# Patient Record
Sex: MALE | Race: WHITE
[De-identification: names, ages, dates, MRNs, and addresses within clinical notes are randomized per-mention and may not be internally consistent; named-entity substitution may affect disease eponyms.]

---

## 2017-06-28 ENCOUNTER — APPOINTMENT (OUTPATIENT)
Dept: CARDIOLOGY | Facility: CLINIC | Age: 77
End: 2017-06-28

## 2017-11-09 ENCOUNTER — RECORD ABSTRACTING (OUTPATIENT)
Age: 77
End: 2017-11-09

## 2017-11-09 DIAGNOSIS — Z82.49 FAMILY HISTORY OF ISCHEMIC HEART DISEASE AND OTHER DISEASES OF THE CIRCULATORY SYSTEM: ICD-10-CM

## 2017-11-09 DIAGNOSIS — Z98.890 OTHER SPECIFIED POSTPROCEDURAL STATES: ICD-10-CM

## 2017-11-09 DIAGNOSIS — I72.8 ANEURYSM OF OTHER SPECIFIED ARTERIES: ICD-10-CM

## 2017-11-09 DIAGNOSIS — R73.03 PREDIABETES.: ICD-10-CM

## 2017-11-09 DIAGNOSIS — G47.30 SLEEP APNEA, UNSPECIFIED: ICD-10-CM

## 2017-11-09 DIAGNOSIS — M35.3 POLYMYALGIA RHEUMATICA: ICD-10-CM

## 2017-11-09 DIAGNOSIS — Z78.9 OTHER SPECIFIED HEALTH STATUS: ICD-10-CM

## 2017-11-09 DIAGNOSIS — Z85.46 PERSONAL HISTORY OF MALIGNANT NEOPLASM OF PROSTATE: ICD-10-CM

## 2017-12-19 ENCOUNTER — APPOINTMENT (OUTPATIENT)
Dept: CARDIOLOGY | Facility: CLINIC | Age: 77
End: 2017-12-19
Payer: MEDICARE

## 2017-12-19 ENCOUNTER — NON-APPOINTMENT (OUTPATIENT)
Age: 77
End: 2017-12-19

## 2017-12-19 VITALS
BODY MASS INDEX: 33.34 KG/M2 | OXYGEN SATURATION: 99 % | HEIGHT: 68 IN | DIASTOLIC BLOOD PRESSURE: 72 MMHG | SYSTOLIC BLOOD PRESSURE: 128 MMHG | HEART RATE: 72 BPM | WEIGHT: 220 LBS

## 2017-12-19 DIAGNOSIS — I44.0 ATRIOVENTRICULAR BLOCK, FIRST DEGREE: ICD-10-CM

## 2017-12-19 DIAGNOSIS — I45.10 UNSPECIFIED RIGHT BUNDLE-BRANCH BLOCK: ICD-10-CM

## 2017-12-19 DIAGNOSIS — I49.3 VENTRICULAR PREMATURE DEPOLARIZATION: ICD-10-CM

## 2017-12-19 PROCEDURE — 99214 OFFICE O/P EST MOD 30 MIN: CPT

## 2017-12-19 PROCEDURE — 93000 ELECTROCARDIOGRAM COMPLETE: CPT

## 2017-12-19 RX ORDER — PREDNISONE 5 MG/1
5 TABLET ORAL DAILY
Refills: 0 | Status: DISCONTINUED | COMMUNITY
End: 2017-12-19

## 2017-12-19 RX ORDER — ALPRAZOLAM 0.25 MG/1
0.25 TABLET ORAL
Refills: 0 | Status: ACTIVE | COMMUNITY

## 2017-12-21 ENCOUNTER — APPOINTMENT (OUTPATIENT)
Dept: CARDIOLOGY | Facility: CLINIC | Age: 77
End: 2017-12-21
Payer: MEDICARE

## 2017-12-21 PROCEDURE — 93306 TTE W/DOPPLER COMPLETE: CPT

## 2018-01-04 ENCOUNTER — APPOINTMENT (OUTPATIENT)
Dept: CARDIOLOGY | Facility: CLINIC | Age: 78
End: 2018-01-04

## 2018-01-09 ENCOUNTER — RX RENEWAL (OUTPATIENT)
Age: 78
End: 2018-01-09

## 2018-01-16 ENCOUNTER — APPOINTMENT (OUTPATIENT)
Dept: CARDIOLOGY | Facility: CLINIC | Age: 78
End: 2018-01-16

## 2018-05-07 ENCOUNTER — APPOINTMENT (OUTPATIENT)
Dept: CARDIOLOGY | Facility: CLINIC | Age: 78
End: 2018-05-07
Payer: MEDICARE

## 2018-05-07 VITALS
DIASTOLIC BLOOD PRESSURE: 70 MMHG | HEART RATE: 64 BPM | HEIGHT: 68 IN | BODY MASS INDEX: 33.65 KG/M2 | WEIGHT: 222 LBS | SYSTOLIC BLOOD PRESSURE: 138 MMHG

## 2018-05-07 PROCEDURE — 99214 OFFICE O/P EST MOD 30 MIN: CPT

## 2018-05-07 RX ORDER — METHYLPREDNISOLONE 4 MG/1
4 TABLET ORAL
Refills: 0 | Status: DISCONTINUED | COMMUNITY
End: 2018-05-07

## 2018-05-07 RX ORDER — LISINOPRIL AND HYDROCHLOROTHIAZIDE TABLETS 20; 12.5 MG/1; MG/1
20-12.5 TABLET ORAL DAILY
Refills: 0 | Status: DISCONTINUED | COMMUNITY
End: 2018-05-07

## 2018-11-12 ENCOUNTER — APPOINTMENT (OUTPATIENT)
Dept: CARDIOLOGY | Facility: CLINIC | Age: 78
End: 2018-11-12
Payer: MEDICARE

## 2018-11-12 VITALS
HEART RATE: 64 BPM | BODY MASS INDEX: 33.34 KG/M2 | SYSTOLIC BLOOD PRESSURE: 130 MMHG | HEIGHT: 68 IN | DIASTOLIC BLOOD PRESSURE: 80 MMHG | WEIGHT: 220 LBS

## 2018-11-12 PROCEDURE — 99214 OFFICE O/P EST MOD 30 MIN: CPT

## 2018-11-12 NOTE — CARDIOLOGY SUMMARY
[No Ischemia] : no Ischemia [Fixed Defect] : fixed defect [___] : [unfilled] [LVEF ___%] : LVEF [unfilled]% [Moderate] : moderate pulmonary hypertension [Enlarged] : enlarged LA size [Mild] : mild mitral regurgitation

## 2018-11-12 NOTE — REASON FOR VISIT
[Follow-Up - Clinic] : a clinic follow-up of [Coronary Artery Disease] : coronary artery disease [Hyperlipidemia] : hyperlipidemia [Hypertension] : hypertension [Medication Management] : Medication management

## 2018-11-12 NOTE — ASSESSMENT
[FreeTextEntry1] : Hypertension and hypertensive heart disease by echocardiography. \par There is left ventricular hypertrophy, dilatation of chambers and thoracic aorta.\par Branch vessel CAD. Reviewed coronary angiography report.\par \par Follow up echocardiogram to monitor aortic dilatation. \par Blood work has been requested.\par Blood pressure is well controlled on today's examination.\par Now off HCTZ. \par Continue ASA and statin rx\par Emphasized lifestyle measures, weight loss, low sodium diet, regular aerobic activity\par \par Note bad experience at [facility]. Requests future procedures elsewhere. \par \par

## 2018-11-27 ENCOUNTER — APPOINTMENT (OUTPATIENT)
Dept: CARDIOLOGY | Facility: CLINIC | Age: 78
End: 2018-11-27

## 2018-11-28 ENCOUNTER — APPOINTMENT (OUTPATIENT)
Dept: CARDIOLOGY | Facility: CLINIC | Age: 78
End: 2018-11-28
Payer: MEDICARE

## 2018-11-28 PROCEDURE — 93306 TTE W/DOPPLER COMPLETE: CPT

## 2018-12-04 ENCOUNTER — APPOINTMENT (OUTPATIENT)
Dept: CARDIOLOGY | Facility: CLINIC | Age: 78
End: 2018-12-04
Payer: MEDICARE

## 2018-12-04 VITALS
BODY MASS INDEX: 33.34 KG/M2 | OXYGEN SATURATION: 98 % | WEIGHT: 220 LBS | HEIGHT: 68 IN | DIASTOLIC BLOOD PRESSURE: 64 MMHG | SYSTOLIC BLOOD PRESSURE: 118 MMHG | HEART RATE: 60 BPM

## 2018-12-04 DIAGNOSIS — I71.9 AORTIC ANEURYSM OF UNSPECIFIED SITE, W/OUT RUPTURE: ICD-10-CM

## 2018-12-04 PROCEDURE — 99214 OFFICE O/P EST MOD 30 MIN: CPT

## 2018-12-04 RX ORDER — OXYBUTYNIN CHLORIDE 5 MG/1
5 TABLET ORAL
Refills: 0 | Status: DISCONTINUED | COMMUNITY
End: 2018-12-04

## 2018-12-04 RX ORDER — IBANDRONATE SODIUM 150 MG/1
150 TABLET, FILM COATED ORAL
Refills: 0 | Status: DISCONTINUED | COMMUNITY
End: 2018-12-04

## 2018-12-04 RX ORDER — RIVASTIGMINE TARTRATE 1.5 MG/1
1.5 CAPSULE ORAL
Refills: 0 | Status: DISCONTINUED | COMMUNITY
End: 2018-12-04

## 2018-12-04 NOTE — ASSESSMENT
[FreeTextEntry1] : KATARINA RAMIREZ  is a 78 year M  who presents today Dec 04, 2018 in clinical follow-up with the above history and the following active issues. \par \par Hypertension and hypertensive heart disease by echocardiography. \par There is left ventricular hypertrophy, dilatation of chambers and thoracic aorta.\par Branch vessel CAD. \par \par Aortic dilatation by echocardiogram.  \par Recommend CTA of chest with contrast for further evaluation of aortic dimensions. \par Blood pressure is well controlled on today's examination.\par Now off HCTZ. \par Continue ASA and statin rx\par Emphasized lifestyle measures, weight loss, low sodium diet, regular aerobic activity\par \par Note bad experience at [facility]. Requests future procedures elsewhere. \par \par Red flag symptoms which would warrant sooner emergent evaluation reviewed with the patient. \par Questions and concerns were addressed and answered. \par \par CT scan will be reviewed over the phone. \par \par Clinical follow-up in our office in 6 months unless symptoms warrant sooner emergent evaluation.\par \par Sincerely,\par \par Marce Newton PA-C\par Patients history, testing and plan reviewed with supervising MD: Dr. Dc Sharma

## 2018-12-04 NOTE — ADDENDUM
[FreeTextEntry1] : Please note the patient was seen and examined with DEBORAH Newton.\madelyn I was physically present during the service of the patient and personally examined the patient. \madelyn I was directly involved in the management plan and recommendations of the care provided to the patient. \madelyn I personally reviewed the history and physical examination as documented by the PA above.\par 12/04/2018\par

## 2018-12-04 NOTE — HISTORY OF PRESENT ILLNESS
[FreeTextEntry1] : KATARINA ARMIREZ  is a 78 year M  who presents today Dec 04, 2018 in clinical follow up. Last seen in our office on November 12, 2018 with Dr. Sharma. Follow-up echo recommended. Testing has been performed and he presents today to review the results. \par Today offers no complaints. Looking forward to going to [place] in mid January. \par \par As you are aware he is a 78 year old male with a history of obesity, obstructive sleep apnea on CPAP, alcohol abuse, hypertension with hypertensive heart disease, thoracic aortic aneurysm, hepatic artery aneurysm followed by [facility], dyslipidemia, PVCs, past heavy tobacco use, subclinical atherosclerosis, PMR and prostate cancer. \par \par Previously cardiac catheterization was recommended. Sx of CRAMER and abnl NST. Seen by Dr. Weller. Branch vessel disease, PCI deferred. Intolerant of nitrates due to headache. Maintained on beta blocker and calcium channel blocker. Blood pressure is within normal limits. Back on statin rx. Reported electrolyte abnl on antihypertensive therapy. \par \par Recently diagnosed with early Alzheimer's. He follows with Dr. Cho.\par He reports he is having more trouble with his memory. \par \par There is no prior history of myocardial infarction or coronary revascularization. \par There is no history of symptomatic congestive heart failure or rheumatic heart disease. \par  \par There is no exertional chest pain, pressure or discomfort. \par There is no significant dyspnea on exertion or orthopnea. \par There are no symptomatic palpitations, lightheadedness, dizziness or syncope.\par \par Echocardiogram November 28, 2018 ejection fraction 60%, mild mitral regurgitation, aortic root 4.8 cm, ascending aorta 4.2 cm\par \par Labs November 21, 2018 Sodium 137, potassium 4.3, creatinine 0.79, BUN 21, HDL 67, LDL 78, total cholesterol 162, \par \par Nuclear stress test. December 2016. Vasodilator study. Normal LV size. Fixed defects. Likely attenuation. No ischemia. \par \par Resting echocardiogram. Ejection fraction 50-55%. Aortic dilatation, 4.5 cm, no aortic regurgitation. Moderate pulmonary hypertension, mild mitral regurgitation. \par \par Cardiac catheterization report from January 2015 reviewed. Initial cardiac catheterization unsuccessful with radial approach. Ejection fraction 55%. Dital left circumflex with 40% stenosis. Acute marginal with 90% stenosis. \par

## 2019-05-28 ENCOUNTER — NON-APPOINTMENT (OUTPATIENT)
Age: 79
End: 2019-05-28

## 2019-05-28 ENCOUNTER — APPOINTMENT (OUTPATIENT)
Dept: CARDIOLOGY | Facility: CLINIC | Age: 79
End: 2019-05-28
Payer: MEDICARE

## 2019-05-28 VITALS
DIASTOLIC BLOOD PRESSURE: 78 MMHG | HEART RATE: 53 BPM | SYSTOLIC BLOOD PRESSURE: 140 MMHG | HEIGHT: 68 IN | WEIGHT: 215 LBS | BODY MASS INDEX: 32.58 KG/M2

## 2019-05-28 DIAGNOSIS — R94.31 ABNORMAL ELECTROCARDIOGRAM [ECG] [EKG]: ICD-10-CM

## 2019-05-28 PROCEDURE — 93000 ELECTROCARDIOGRAM COMPLETE: CPT

## 2019-05-28 PROCEDURE — 99214 OFFICE O/P EST MOD 30 MIN: CPT

## 2019-05-28 RX ORDER — OXYBUTYNIN CHLORIDE 10 MG/1
10 TABLET, EXTENDED RELEASE ORAL DAILY
Refills: 0 | Status: ACTIVE | COMMUNITY

## 2019-05-28 RX ORDER — RIVASTIGMINE TARTRATE 6 MG/1
6 CAPSULE ORAL TWICE DAILY
Refills: 0 | Status: ACTIVE | COMMUNITY

## 2019-05-28 NOTE — REVIEW OF SYSTEMS
[Memory Lapses Or Loss] : memory lapses or loss [Negative] : Heme/Lymph [Lower Ext Edema] : lower extremity edema [see HPI] : see HPI

## 2019-05-28 NOTE — CARDIOLOGY SUMMARY
[No Ischemia] : no Ischemia [Fixed Defect] : fixed defect [___] : [unfilled] [LVEF ___%] : LVEF [unfilled]% [Moderate] : moderate pulmonary hypertension [Mild] : mild mitral regurgitation [Enlarged] : enlarged LA size

## 2019-05-28 NOTE — PHYSICAL EXAM
[General Appearance - Well Developed] : well developed [Normal Appearance] : normal appearance [Well Groomed] : well groomed [General Appearance - Well Nourished] : well nourished [No Deformities] : no deformities [General Appearance - In No Acute Distress] : no acute distress [Respiration, Rhythm And Depth] : normal respiratory rhythm and effort [Exaggerated Use Of Accessory Muscles For Inspiration] : no accessory muscle use [Auscultation Breath Sounds / Voice Sounds] : lungs were clear to auscultation bilaterally [Heart Rate And Rhythm] : heart rate and rhythm were normal [Murmurs] : no murmurs present [Heart Sounds] : normal S1 and S2 [Abdomen Tenderness] : non-tender [Abdomen Soft] : soft [Abnormal Walk] : normal gait [Abdomen Mass (___ Cm)] : no abdominal mass palpated [Cyanosis, Localized] : no localized cyanosis [Gait - Sufficient For Exercise Testing] : the gait was sufficient for exercise testing [Nail Clubbing] : no clubbing of the fingernails [Skin Color & Pigmentation] : normal skin color and pigmentation [Petechial Hemorrhages (___cm)] : no petechial hemorrhages [No Venous Stasis] : no venous stasis [] : no rash [Skin Lesions] : no skin lesions [No Skin Ulcers] : no skin ulcer [No Xanthoma] : no  xanthoma was observed [Oriented To Time, Place, And Person] : oriented to person, place, and time [Affect] : the affect was normal [Mood] : the mood was normal [No Anxiety] : not feeling anxious [FreeTextEntry1] : tr edema

## 2019-05-28 NOTE — HISTORY OF PRESENT ILLNESS
[FreeTextEntry1] : KATARINA RAMIREZ  is a 78 year old  M\par \par with a history of obesity, obstructive sleep apnea on CPAP, alcohol misuse, hypertension with hypertensive heart disease, thoracic aortic aneurysm, hepatic artery aneurysm followed by [facility], dyslipidemia, PVCs, past heavy tobacco use, subclinical atherosclerosis, PMR and prostate cancer. \par \par Previously cardiac catheterization was recommended. Sx of CRAMER and abnl NST. Seen by Dr. Weller. Branch vessel disease, PCI deferred. Intolerant of nitrates due to headache. Maintained on beta blocker and calcium channel blocker. Blood pressure is within normal limits. Back on statin rx. Reported electrolyte abnl on antihypertensive therapy. \par \par There is no prior history of myocardial infarction or coronary revascularization. \par There is no history of symptomatic congestive heart failure or rheumatic heart disease. \par \par Recently diagnosed with early Alzheimer's. He follows with Dr. Cho.\par He reports he intermittently has trouble with his memory. \par \par There is no exertional chest pain, pressure or discomfort. \par There is no significant dyspnea on exertion or orthopnea. \par There are no symptomatic palpitations, lightheadedness, dizziness or syncope.\par There is mild edema related to amlodipine.\par \par EKG demonstrates sinus bradycardia with first degree AV block, left axis deviation, incomplete right bundle branch block, poor R-wave progression\par \par November 2018. Hemoglobin 13.3, potassium 4.3, creatinine 0.8, LDL 78. \par Blood work, May 2019, hemoglobin 12.7, potassium 4.5, creatinine 0.8, total cholesterol 146, LDL 64, TSH 1.8.\par \par Last echocardiogram November 2018, ejection fraction 60% mild left ventricular hypertrophy. Aortic root 4.8. Ascending aorta 4.2. No aortic regurgitation \par Followup CT reports measurement of 4.2 cm there is cardiomegaly with coronary artery calcifications\par \par Nuclear stress test. December 2016. Vasodilator study. Normal LV size. Fixed defects. Likely attenuation. No ischemia. \par \par Cardiac catheterization report from January 2015 reviewed. Initial cardiac catheterization unsuccessful with radial approach. Ejection fraction 55%. Dital left circumflex with 40% stenosis. Acute marginal with 90% stenosis. \par

## 2019-05-28 NOTE — ASSESSMENT
[FreeTextEntry1] : Hypertension and hypertensive heart disease by echocardiography. \par There is left ventricular hypertrophy, dilatation of chambers and thoracic aorta.\par Branch vessel CAD. Reviewed coronary angiography report. No angina\par Minor edema\par \par Monitor aortic dilatation and LVEF. \par \par Continue ASA and statin rx\par Blood pressure is well controlled \par Continue combination therapy. Low-sodium diet. Keep legs elevated. \par Lisinopril refilled. \par \par Emphasized lifestyle measures, weight loss, low sodium diet, regular aerobic activity\par \par Note bad experience at [facility]. Requests future procedures elsewhere.

## 2019-05-28 NOTE — REASON FOR VISIT
[Hyperlipidemia] : hyperlipidemia [Follow-Up - Clinic] : a clinic follow-up of [Coronary Artery Disease] : coronary artery disease [Hypertension] : hypertension [Medication Management] : Medication management

## 2019-11-07 ENCOUNTER — RECORD ABSTRACTING (OUTPATIENT)
Age: 79
End: 2019-11-07

## 2019-11-12 ENCOUNTER — APPOINTMENT (OUTPATIENT)
Dept: CARDIOLOGY | Facility: CLINIC | Age: 79
End: 2019-11-12
Payer: MEDICARE

## 2019-11-12 VITALS
OXYGEN SATURATION: 98 % | BODY MASS INDEX: 31.22 KG/M2 | SYSTOLIC BLOOD PRESSURE: 122 MMHG | DIASTOLIC BLOOD PRESSURE: 68 MMHG | WEIGHT: 206 LBS | HEIGHT: 68 IN | HEART RATE: 59 BPM

## 2019-11-12 DIAGNOSIS — R60.0 LOCALIZED EDEMA: ICD-10-CM

## 2019-11-12 PROCEDURE — 99214 OFFICE O/P EST MOD 30 MIN: CPT

## 2019-11-12 RX ORDER — ACETAMINOPHEN 650 MG/1
650 TABLET, FILM COATED, EXTENDED RELEASE ORAL 4 TIMES DAILY
Refills: 0 | Status: ACTIVE | COMMUNITY

## 2019-11-12 RX ORDER — CITICOLINE SODIUM 500 MG
500 TABLET ORAL AS DIRECTED
Refills: 0 | Status: ACTIVE | COMMUNITY

## 2019-11-12 NOTE — PHYSICAL EXAM
[General Appearance - Well Developed] : well developed [Normal Appearance] : normal appearance [General Appearance - Well Nourished] : well nourished [Well Groomed] : well groomed [No Deformities] : no deformities [General Appearance - In No Acute Distress] : no acute distress [Respiration, Rhythm And Depth] : normal respiratory rhythm and effort [Exaggerated Use Of Accessory Muscles For Inspiration] : no accessory muscle use [Auscultation Breath Sounds / Voice Sounds] : lungs were clear to auscultation bilaterally [Heart Rate And Rhythm] : heart rate and rhythm were normal [Heart Sounds] : normal S1 and S2 [Murmurs] : no murmurs present [Abdomen Soft] : soft [Abdomen Tenderness] : non-tender [Abdomen Mass (___ Cm)] : no abdominal mass palpated [Abnormal Walk] : normal gait [Gait - Sufficient For Exercise Testing] : the gait was sufficient for exercise testing [Nail Clubbing] : no clubbing of the fingernails [Cyanosis, Localized] : no localized cyanosis [Petechial Hemorrhages (___cm)] : no petechial hemorrhages [Skin Color & Pigmentation] : normal skin color and pigmentation [] : no rash [No Venous Stasis] : no venous stasis [Skin Lesions] : no skin lesions [No Skin Ulcers] : no skin ulcer [Oriented To Time, Place, And Person] : oriented to person, place, and time [Affect] : the affect was normal [No Xanthoma] : no  xanthoma was observed [No Anxiety] : not feeling anxious [Mood] : the mood was normal [FreeTextEntry1] : tr edema

## 2019-11-12 NOTE — HISTORY OF PRESENT ILLNESS
[FreeTextEntry1] : KATARINA RAMIREZ  is a 79 year old  M\par \par with a history of obesity, obstructive sleep apnea on CPAP, alcohol misuse, hypertension with hypertensive heart disease, thoracic aortic aneurysm, hepatic artery aneurysm followed by [facility], dyslipidemia, PVCs, past heavy tobacco use, subclinical atherosclerosis, PMR and prostate cancer. \par \par Previously cardiac catheterization was recommended. Sx of CRAMER and abnl NST. Seen by Dr. Weller. Branch vessel disease, PCI deferred. Intolerant of nitrates due to headache. Maintained on beta blocker and calcium channel blocker. Blood pressure is within normal limits. Back on statin rx. Reported electrolyte abnl on antihypertensive therapy. \par \par There is no prior history of myocardial infarction or coronary revascularization. \par There is no history of symptomatic congestive heart failure or rheumatic heart disease. \par \par Diagnosed with early Alzheimer's. He follows with Dr. Cho.\par He reports he intermittently has trouble with his memory. \par \par Since last seen, no illness or hospital stay. 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. \par There is no exertional chest pain, pressure or discomfort. \par There is no significant dyspnea on exertion or orthopnea. \par There are no symptomatic palpitations, lightheadedness, dizziness or syncope.\par There is mild edema related to amlodipine.\par \par EKG demonstrates sinus bradycardia with first degree AV block, left axis deviation, incomplete right bundle branch block, poor R-wave progression\par \par Blood work, May 2019, hemoglobin 12.7, potassium 4.5, creatinine 0.8, total cholesterol 146, LDL 64, TSH 1.8.\par \par Last echocardiogram November 2018, ejection fraction 60% mild left ventricular hypertrophy. Aortic root 4.8. Ascending aorta 4.2. No aortic regurgitation \par --- CT 12/2018 reports measurement of 4.2 cm at the aortic root, there is cardiomegaly with coronary artery calcifications\par \par Nuclear stress test. December 2016. Vasodilator study. Normal LV size. Fixed defects. Likely attenuation. No ischemia. \par \par Cardiac catheterization report from January 2015 reviewed. Initial cardiac catheterization unsuccessful with radial approach. Ejection fraction 55%. Dital left circumflex with 40% stenosis. Acute marginal with 90% stenosis. \par

## 2019-11-12 NOTE — ASSESSMENT
[FreeTextEntry1] : \par Hypertension and hypertensive heart disease by echocardiography. \par There is left ventricular hypertrophy, dilatation of chambers and thoracic aorta.\par Branch vessel CAD. Reviewed coronary angiography report. \par He remains with good baseline functional status, no angina. \par Monitor LV function by echocardiogram. \par \par CT reports largest aorta diameter of 4.2cm. BP is well controlled today. \par Surveillance monitoring recommended. \par Assess abdominal aorta by ultrasound. Recheck thoracic dimensions by echo. \par \par Continue ASA and statin rx\par Blood pressure is well controlled on ACE-I and norvasc. \par Continue combination therapy. Low-sodium diet. Keep legs elevated. \par \par Emphasized lifestyle measures, weight loss, low sodium diet, regular aerobic activity\par \par Note bad experience at [facility]. Requests future procedures elsewhere. \par \par Will call to address above results. Otherwise, routine F/U in 6 months.\par Any questions and concerns were addressed and resolved. \par \par Sincerely,\par \par CHENTE Rojo\par Patients history, testing, and plan reviewed with supervising MD: Dr. Dc Sharma

## 2019-11-12 NOTE — ADDENDUM
[FreeTextEntry1] : Please note the patient was seen and examined with NP oCrrina Lagunas\madelyn I was physically present during the service of the patient and personally examined the patient. \madelyn I was directly involved in the management plan and recommendations of the care provided to the patient. \madelyn I personally reviewed the history and physical examination as documented by the NP above.\par 11/12/2019\par

## 2019-11-12 NOTE — REASON FOR VISIT
[Follow-Up - Clinic] : a clinic follow-up of [Coronary Artery Disease] : coronary artery disease [Abnormal ECG] : an abnormal ECG [Hypertension] : hypertension [Hyperlipidemia] : hyperlipidemia [Medication Management] : Medication management [Peripheral Vascular Disease] : peripheral vascular disease

## 2019-11-20 ENCOUNTER — APPOINTMENT (OUTPATIENT)
Dept: CARDIOLOGY | Facility: CLINIC | Age: 79
End: 2019-11-20
Payer: MEDICARE

## 2019-11-20 PROCEDURE — 93306 TTE W/DOPPLER COMPLETE: CPT

## 2019-11-20 PROCEDURE — 93880 EXTRACRANIAL BILAT STUDY: CPT

## 2019-11-20 PROCEDURE — 93979 VASCULAR STUDY: CPT

## 2019-11-26 ENCOUNTER — APPOINTMENT (OUTPATIENT)
Dept: CARDIOLOGY | Facility: CLINIC | Age: 79
End: 2019-11-26

## 2020-08-25 ENCOUNTER — NON-APPOINTMENT (OUTPATIENT)
Age: 80
End: 2020-08-25

## 2020-08-25 ENCOUNTER — APPOINTMENT (OUTPATIENT)
Dept: CARDIOLOGY | Facility: CLINIC | Age: 80
End: 2020-08-25
Payer: MEDICARE

## 2020-08-25 VITALS
WEIGHT: 204 LBS | BODY MASS INDEX: 30.92 KG/M2 | TEMPERATURE: 96.9 F | DIASTOLIC BLOOD PRESSURE: 74 MMHG | HEIGHT: 68 IN | SYSTOLIC BLOOD PRESSURE: 142 MMHG | HEART RATE: 70 BPM | OXYGEN SATURATION: 97 %

## 2020-08-25 DIAGNOSIS — I10 ESSENTIAL (PRIMARY) HYPERTENSION: ICD-10-CM

## 2020-08-25 DIAGNOSIS — E78.2 MIXED HYPERLIPIDEMIA: ICD-10-CM

## 2020-08-25 DIAGNOSIS — I25.10 ATHEROSCLEROTIC HEART DISEASE OF NATIVE CORONARY ARTERY W/OUT ANGINA PECTORIS: ICD-10-CM

## 2020-08-25 DIAGNOSIS — I70.90 UNSPECIFIED ATHEROSCLEROSIS: ICD-10-CM

## 2020-08-25 DIAGNOSIS — E66.9 OBESITY, UNSPECIFIED: ICD-10-CM

## 2020-08-25 PROCEDURE — 99214 OFFICE O/P EST MOD 30 MIN: CPT

## 2020-08-25 PROCEDURE — 93000 ELECTROCARDIOGRAM COMPLETE: CPT

## 2020-08-25 NOTE — ADDENDUM
[FreeTextEntry1] : Please note the patient was seen and examined with DEBORAH Newton.\par I was physically present during the service of the patient and personally examined the patient. \par I was directly involved in the management plan and recommendations of the care provided to the patient. \par I personally reviewed the history and physical examination as documented by the PA above.\par 08/25/2020\par \par Trial prava, maybe bempedoic acid next, doesn’t want injectables

## 2020-08-25 NOTE — PHYSICAL EXAM
[General Appearance - Well Developed] : well developed [Normal Appearance] : normal appearance [Well Groomed] : well groomed [General Appearance - Well Nourished] : well nourished [No Deformities] : no deformities [General Appearance - In No Acute Distress] : no acute distress [Respiration, Rhythm And Depth] : normal respiratory rhythm and effort [Exaggerated Use Of Accessory Muscles For Inspiration] : no accessory muscle use [Auscultation Breath Sounds / Voice Sounds] : lungs were clear to auscultation bilaterally [Heart Rate And Rhythm] : heart rate and rhythm were normal [Heart Sounds] : normal S1 and S2 [Murmurs] : no murmurs present [Abdomen Tenderness] : non-tender [Abdomen Soft] : soft [Abdomen Mass (___ Cm)] : no abdominal mass palpated [Abnormal Walk] : normal gait [Nail Clubbing] : no clubbing of the fingernails [Gait - Sufficient For Exercise Testing] : the gait was sufficient for exercise testing [Petechial Hemorrhages (___cm)] : no petechial hemorrhages [Cyanosis, Localized] : no localized cyanosis [Skin Color & Pigmentation] : normal skin color and pigmentation [] : no rash [Skin Lesions] : no skin lesions [No Venous Stasis] : no venous stasis [No Skin Ulcers] : no skin ulcer [No Xanthoma] : no  xanthoma was observed [Affect] : the affect was normal [Oriented To Time, Place, And Person] : oriented to person, place, and time [Mood] : the mood was normal [No Anxiety] : not feeling anxious [FreeTextEntry1] : no JVD

## 2020-08-25 NOTE — HISTORY OF PRESENT ILLNESS
[FreeTextEntry1] : KATARINA RAMIREZ  is a 80 year M  who presents today Aug 25, 2020 in clinical follow-up. Last seen in our office on November 12, 2019. Off Atorvastatin for the past 2 months due to myalgias. Cramping and pain has significantly improved off statin. Taking ASA prn due to high frequency of bruising. \par \par Past history of obesity, obstructive sleep apnea on CPAP, alcohol misuse, hypertension with hypertensive heart disease, thoracic aortic aneurysm, hepatic artery aneurysm followed by [facility], dyslipidemia, PVCs, past heavy tobacco use, subclinical atherosclerosis, PMR and prostate cancer. \par \par Previously cardiac catheterization was recommended. Sx of CRAMER and abnl NST. Seen by Dr. Weller. Branch vessel disease, PCI deferred. Intolerant of nitrates due to headache. Maintained on beta blocker and calcium channel blocker. Blood pressure is within normal limits. Back on statin rx. Reported electrolyte abnl on antihypertensive therapy. \par \par There is no prior history of myocardial infarction or coronary revascularization. \par There is no history of symptomatic congestive heart failure or rheumatic heart disease. \par \par Diagnosed with early Alzheimer's. He follows with Dr. Cho.\par He reports he intermittently has trouble with his memory. \par \par Since last seen, no illness or hospital stay. \par Overall remains active with no new exertional complaints \par There is no exertional chest pain, pressure or discomfort. \par There is no significant dyspnea on exertion or orthopnea. \par There are no symptomatic palpitations, lightheadedness, dizziness or syncope.\par There is mild edema related to amlodipine.\par \par \par EKG 8/25/2020 demonstrates sinus bradycardia with first degree AV block, left axis deviation, incomplete right bundle branch block, poor R-wave progression\par \par Lab results 8/2020 total cholesterol 225, , HDL 66\par \par Echo 11/20/2019 EF 55%, mild MR, dilated aortic root\par \par Carotid US 11/2019 mod plaque\par \par Abd US 11/2019 no AAA, mod plaque\par \par \par Blood work, May 2019, hemoglobin 12.7, potassium 4.5, creatinine 0.8, total cholesterol 146, LDL 64, TSH 1.8.\par \par echocardiogram November 2018, ejection fraction 60% mild left ventricular hypertrophy. Aortic root 4.8. Ascending aorta 4.2. No aortic regurgitation \par \par --- CT 12/2018 reports measurement of 4.2 cm at the aortic root, there is cardiomegaly with coronary artery calcifications\par \par Nuclear stress test. December 2016. Vasodilator study. Normal LV size. Fixed defects. Likely attenuation. No ischemia. \par \par Cardiac catheterization report from January 2015 reviewed. Initial cardiac catheterization unsuccessful with radial approach. Ejection fraction 55%. Dital left circumflex with 40% stenosis. Acute marginal with 90% stenosis. \par

## 2020-08-25 NOTE — REASON FOR VISIT
[Follow-Up - Clinic] : a clinic follow-up of [Abnormal ECG] : an abnormal ECG [Coronary Artery Disease] : coronary artery disease [Hyperlipidemia] : hyperlipidemia [Medication Management] : Medication management [Hypertension] : hypertension [Peripheral Vascular Disease] : peripheral vascular disease

## 2020-08-25 NOTE — ASSESSMENT
[FreeTextEntry1] : KATARINA RAMIREZ  is a 80 year M  who presents today Aug 25, 2020 with the above history and the following active issues. \par \par Hypertension and hypertensive heart disease by echocardiography. \par There is left ventricular hypertrophy, dilatation of chambers and thoracic aorta. Blood pressure well controlled on my assessment. Low sodium diet. Increase cardiovascular exercise. on ACE-I and norvasc\par \par Branch vessel CAD. Reviewed coronary angiography report. Reviewed recommendation of daily ASA. Bleeding precautions reviewed. Intolerant to Atorvastatin. Recommend trial of Pravastatin 20mg. Follow-up blood work in 6 weeks. \par \par CT reports largest aorta diameter of 4.2cm. BP is well controlled today. \par Surveillance monitoring recommended. \par \par Emphasized lifestyle measures, weight loss, low sodium diet, regular aerobic activity\par Cardiovascular testing from November 2019 reviewed with the patient. Limitation of non-invasive testing reviewed with the patient.\par \par Note bad experience at [facility]. Requests future procedures elsewhere. \par \par Red flag symptoms which would warrant sooner emergent evaluation reviewed with the patient. \par Questions and concerns were addressed and answered. \par \par Clinical follow-up in 6 months unless symptoms warrant sooner emergent evaluation.\par Sincerely,\par \par Marce Newton PA-C\par Patients history, testing and plan reviewed with supervising MD: Dr. Dc Sharma

## 2020-10-29 ENCOUNTER — NON-APPOINTMENT (OUTPATIENT)
Age: 80
End: 2020-10-29

## 2021-02-08 ENCOUNTER — APPOINTMENT (OUTPATIENT)
Dept: CARDIOLOGY | Facility: CLINIC | Age: 81
End: 2021-02-08

## 2021-06-25 ENCOUNTER — NON-APPOINTMENT (OUTPATIENT)
Age: 81
End: 2021-06-25

## 2021-08-03 RX ORDER — LISINOPRIL 10 MG/1
10 TABLET ORAL DAILY
Qty: 90 | Refills: 1 | Status: ACTIVE | COMMUNITY
Start: 1900-01-01 | End: 1900-01-01

## 2021-08-03 NOTE — HISTORY OF PRESENT ILLNESS
[facility]   DAILY PROGRESS NOTE    ADMISSION DATE:  7/29/2021  DATE:  8/3/2021  CURRENT HOSPITAL DAY:  Hospital Day: 6  ATTENDING PHYSICIAN:  Guerita Locke MD  CODE STATUS:  Full Resuscitation    CHIEF COMPLAINT:  Positive cologuard    INTERVAL HISTORY:    Cardiology cleared but has not been cleared by pulmonology. As colonoscopy is not urgent, can be done as outpt.  Denies abdominal pain, no nausea or vomiting.     MEDICATIONS:    The medication list was reviewed today.     HISTORIES:     I have reviewed the past medical history, family history, social history, medications and allergies listed in the medical record as obtained by my nursing staff and support staff and agree with their documentation.    OBJECTIVE:    Visit Vitals  /69 (BP Location: LUE - Left upper extremity, Patient Position: Sitting)   Pulse 75   Temp 97.4 °F (36.3 °C) (Temporal)   Resp 20   Ht 5' 5\" (1.651 m)   Wt 52.2 kg   SpO2 98%   BMI 19.15 kg/m²     INTAKE/OUTPUT:      Intake/Output Summary (Last 24 hours) at 8/3/2021 1226  Last data filed at 8/3/2021 0900  Gross per 24 hour   Intake 3692 ml   Output 2101 ml   Net 1591 ml         PHYSICAL EXAM:    General: The patient is well developed, well nourished, in no acute distress, appears stated age.   Neurologic: Alert. Normal mood and affect, normal speech, no gross tremor.  Head: Normocephalic.  Eyes: Normal conjunctivae and sclerae.   HEENT: Oropharynx clear, moist mucous membranes, external nose is normal to inspection.  Neck: Symmetric without swelling or tenderness.   Respiratory: Respiratory effort normal. On O2 NC  Extremities: No swelling or tenderness.  Gastrointestinal:  Soft and nontender.  Normal bowel sounds.  LABORATORY DATA:    Lab Results   Component Value Date    WBC 13.4 (H) 08/02/2021    HGB 8.0 (L) 08/03/2021    HCT 26.3 (L) 08/03/2021     08/02/2021    MCV 98.5 08/02/2021   ,   Lab Results   Component Value Date    SODIUM 142 08/02/2021     POTASSIUM 4.2 08/02/2021    BUN 8 08/02/2021    CREATININE 0.30 (L) 08/02/2021    CALCIUM 8.2 (L) 08/02/2021    ALBUMIN 2.5 (L) 08/02/2021    BILIRUBIN 0.3 08/02/2021    ALKPT 69 08/02/2021    AST 18 08/02/2021    GPT 16 08/02/2021    and   Lab Results   Component Value Date    INR 1.0 07/30/2021       IMAGING STUDIES:    Imaging studies reviewed.       ENDOSCOPY:  10/31/21-[id number]-Zy. Hernandez-polyps and inadequate bowel prep, repeat in 3 years                           ASSESSMENT/PLAN:  Positive Cologuard-outpatient colonoscopy canceled due to respiratory status  Colonoscopy as outpatient when cleared by pulmonology  Awaiting placement for subacute NH  GI signing off, please call with questions/concerns    SIGNED:  ANALY Erazo  8/3/2021  12:26 PM     [FreeTextEntry1] : KATARINA RAMIREZ  is a 78 year old  M\par \par \par with a history of obesity, obstructive sleep apnea on CPAP, alcohol abuse, hypertension with hypertensive heart disease, thoracic aortic aneurysm, hepatic artery aneurysm followed by [facility], dyslipidemia, PVCs, past heavy tobacco use, subclinical atherosclerosis, PMR and prostate cancer. \par \par Previously cardiac catheterization was recommended. Sx of CRAMER and abnl NST. Seen by Dr. Weller. Branch vessel disease, PCI deferred. Intolerant of nitrates due to headache. Maintained on beta blocker and calcium channel blocker. Blood pressure is within normal limits. Back on statin rx. Reported electrolyte abnl on antihypertensive therapy. \par \par Recently diagnosed with early Alzheimer's. He follows with Dr. Cho.\par He reports he is having more trouble with his memory. \par \par There is no prior history of myocardial infarction or coronary revascularization. \par There is no history of symptomatic congestive heart failure or rheumatic heart disease. \par  \par There is no exertional chest pain, pressure or discomfort. \par There is no significant dyspnea on exertion or orthopnea. \par There are no symptomatic palpitations, lightheadedness, dizziness or syncope.\par \par Presently is taking lisinopril, amlodipine, aspirin, and atorvastatin. \par \par Nuclear stress test. December 2016. Vasodilator study. Normal LV size. Fixed defects. Likely attenuation. No ischemia. \par \par Resting echocardiogram. Ejection fraction 50-55%. Aortic dilatation, 4.5 cm, no aortic regurgitation. Moderate pulmonary hypertension, mild mitral regurgitation. \par \par Cardiac catheterization report from January 2015 reviewed. Initial cardiac catheterization unsuccessful with radial approach. Ejection fraction 55%. Dital left circumflex with 40% stenosis. Acute marginal with 90% stenosis. \par

## 2021-11-01 ENCOUNTER — APPOINTMENT (OUTPATIENT)
Dept: CARDIOLOGY | Facility: CLINIC | Age: 81
End: 2021-11-01
Payer: MEDICARE

## 2021-11-01 ENCOUNTER — NON-APPOINTMENT (OUTPATIENT)
Age: 81
End: 2021-11-01

## 2021-11-01 DIAGNOSIS — I71.2 THORACIC AORTIC ANEURYSM, W/OUT RUPTURE: ICD-10-CM

## 2021-11-01 DIAGNOSIS — I45.3 TRIFASCICULAR BLOCK: ICD-10-CM

## 2021-11-01 DIAGNOSIS — I73.9 PERIPHERAL VASCULAR DISEASE, UNSPECIFIED: ICD-10-CM

## 2021-11-01 DIAGNOSIS — I11.9 HYPERTENSIVE HEART DISEASE W/OUT HEART FAILURE: ICD-10-CM

## 2021-11-01 PROCEDURE — 93000 ELECTROCARDIOGRAM COMPLETE: CPT

## 2021-11-01 RX ORDER — EZETIMIBE 10 MG/1
10 TABLET ORAL DAILY
Qty: 90 | Refills: 3 | Status: ACTIVE | COMMUNITY
Start: 2021-11-01 | End: 1900-01-01

## 2021-11-01 RX ORDER — ATORVASTATIN CALCIUM 20 MG/1
20 TABLET, FILM COATED ORAL DAILY
Qty: 90 | Refills: 1 | Status: DISCONTINUED | COMMUNITY
Start: 1900-01-01 | End: 2021-11-01

## 2021-11-01 RX ORDER — CLOPIDOGREL BISULFATE 75 MG/1
75 TABLET, FILM COATED ORAL DAILY
Refills: 0 | Status: ACTIVE | COMMUNITY

## 2021-11-01 RX ORDER — ASPIRIN 81 MG
81 TABLET, DELAYED RELEASE (ENTERIC COATED) ORAL
Refills: 0 | Status: DISCONTINUED | COMMUNITY
End: 2021-11-01

## 2021-11-01 RX ORDER — MEMANTINE HYDROCHLORIDE 10 MG/1
10 TABLET, FILM COATED ORAL TWICE DAILY
Refills: 0 | Status: ACTIVE | COMMUNITY

## 2021-11-01 RX ORDER — PRAVASTATIN SODIUM 20 MG/1
20 TABLET ORAL
Qty: 60 | Refills: 0 | Status: DISCONTINUED | COMMUNITY
Start: 2020-08-25 | End: 2021-11-01

## 2021-11-12 RX ORDER — AMLODIPINE BESYLATE 5 MG/1
5 TABLET ORAL DAILY
Qty: 90 | Refills: 3 | Status: ACTIVE | COMMUNITY
Start: 1900-01-01 | End: 1900-01-01

## 2021-11-18 NOTE — ASSESSMENT
[FreeTextEntry1] : Outside records have been requested. \par He declined statin therapy and stress testing.  \par Start Zetia.  Possible candidate for injectable therapies.  \par Continue antiplatelet therapy. \par Follow-up echocardiogram carotid Doppler. 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 \par There is left ventricular hypertrophy, dilatation of chambers and thoracic aorta. \par Blood pressure well controlled on my assessment. \par Low sodium diet.\par  Increase cardiovascular exercise. on ACE-I and norvasc\par \par Branch vessel CAD. Reviewed coronary angiography report. Reviewed recommendation of daily ASA. Bleeding precautions reviewed. Intolerant to Atorvastatin. \par \par CT reports largest aorta diameter of 4.2cm. BP is well controlled today. \par Surveillance monitoring recommended. \par \par Emphasized lifestyle measures, weight loss, low sodium diet, regular aerobic activity\par \par Note bad experience at [facility] Requests future procedures elsewhere.\par \par Echo requested to monitor aortic dimensions\par Carotid Doppler requested to monitor plaque\par \par Underwent vascular procedure, right leg at [facility].\par Copy of the records from procedure under Dr. Uribe at [facility] requested\par \par Reviewed EKG and medication regimen.\par \par Now on Plavix. \par Patient has requested to discontinue statin. \par Start Zetia 10mg medication in place of statin for lipid lowering therapy. \par Instructed to contact office if adverse effects. \par \par F/u after testing\par \par All patient concerns and questions were addressed. Discussed red flag symptoms that would warrant immediate intervention. Instructed to call if any new symptoms\par Nov 1 2021 10:30AM

## 2021-11-18 NOTE — HISTORY OF PRESENT ILLNESS
[FreeTextEntry1] : KATARINA RAMIREZ  is a 81 year old  M\par \par H/o obesity, obstructive sleep apnea on CPAP, alcohol misuse, HTN with hypertensive heart disease, thoracic aortic aneurysm, hepatic artery aneurysm followed by [facility], dyslipidemia, PVC's, past heavy tobacco, subclinical atherosclerosis, PMR and prostate cancer.\par He is now retired. \par \par Family hx of CVD. Mother had bypass surgery. Passed away from CVD. \par \par Off Atorvastatin for the past 2 months due to myalgias. Cramping and pain has significantly improved off statin. Taking ASA prn due to high frequency of bruising. \par \par Previously cardiac catheterization was recommended. Sx of CRAMER and abnl NST. Seen by Dr. Weller. Branch vessel disease, PCI deferred. Intolerant of nitrates due to headache. Maintained on beta blocker and calcium channel blocker. Blood pressure is within normal limits. Back on statin rx. Reported electrolyte abnl on antihypertensive therapy. \par \par There is no prior history of myocardial infarction or coronary revascularization. \par There is no history of symptomatic congestive heart failure or rheumatic heart disease. \par \par Diagnosed with early Alzheimer's. He follows with Dr. Cho.\par He reports he intermittently has trouble with his memory. \par \par In the interim, he underwent a right leg vascular procedure at [facility] August 2021. \par Saw Dr. Mikie Uribe. \par \par Remains active with boating and climbing stairs to apartment. \par There is no exertional chest pain, pressure or discomfort. \par There is no significant dyspnea on exertion or orthopnea. 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  \par \par EKG demonstrates sinus bradycardia with trifascicular block. \par \par  n the interim he underwent a vascular procedure at [facility].  \par \par   Blood work October 2020 creatinine 0.9  previously did not take statins due to leg cramps.  This likely was related to PAD.  He refuses to reconsider statin therapy.  Blood work June 2021 TSH 2.1 total cholesterol 205  potassium 4.8 creatinine 1.1 hemoglobin 13.7 clinical follow-up will be scheduled after noninvasive testing\par \par Patient declined stress testing and statin medications. \par \par Lab results 8/2020 total cholesterol 187, , HDL 66 \par Echo 11/20/2019 EF 55%, mild MR, dilated aortic root aorta 4.8cm\par Carotid US 11/2019 moderate plaque\par Abd US 11/2019 no AAA, mod plaque\par CT 12/2018 reports measurement of 4.2 cm at the aortic root, there is cardiomegaly with coronary artery calcifications\par Nuclear stress test. December 2016. Vasodilator study. Normal LV size. Fixed defects. Likely attenuation. No ischemia. \par EKG 11/1/2021 sinus gertrude with trifascicular block \par \par Cardiac catheterization report from January 2015 reviewed. Initial cardiac catheterization unsuccessful with radial approach. Ejection fraction 55%. Dital left circumflex with 40% stenosis. Acute marginal with 90% stenosis. \par \par Trial prava, maybe bempedoic acid next, doesn’t want injectables

## 2021-11-22 ENCOUNTER — APPOINTMENT (OUTPATIENT)
Dept: CARDIOLOGY | Facility: CLINIC | Age: 81
End: 2021-11-22
Payer: MEDICARE

## 2021-11-22 PROCEDURE — 93880 EXTRACRANIAL BILAT STUDY: CPT

## 2021-11-22 PROCEDURE — 93306 TTE W/DOPPLER COMPLETE: CPT

## 2022-06-29 ENCOUNTER — APPOINTMENT (OUTPATIENT)
Dept: ORTHOPEDIC SURGERY | Facility: CLINIC | Age: 82
End: 2022-06-29